# Patient Record
Sex: MALE | Race: BLACK OR AFRICAN AMERICAN | NOT HISPANIC OR LATINO | Employment: FULL TIME | URBAN - METROPOLITAN AREA
[De-identification: names, ages, dates, MRNs, and addresses within clinical notes are randomized per-mention and may not be internally consistent; named-entity substitution may affect disease eponyms.]

---

## 2024-01-09 ENCOUNTER — HOSPITAL ENCOUNTER (EMERGENCY)
Facility: OTHER | Age: 34
Discharge: HOME | End: 2024-01-09
Attending: EMERGENCY MEDICINE
Payer: MEDICAID

## 2024-01-09 VITALS
BODY MASS INDEX: 28 KG/M2 | HEIGHT: 71 IN | RESPIRATION RATE: 19 BRPM | HEART RATE: 109 BPM | TEMPERATURE: 98 F | SYSTOLIC BLOOD PRESSURE: 157 MMHG | DIASTOLIC BLOOD PRESSURE: 92 MMHG | WEIGHT: 200 LBS | OXYGEN SATURATION: 97 %

## 2024-01-09 DIAGNOSIS — J06.9 VIRAL URI WITH COUGH: Primary | ICD-10-CM

## 2024-01-09 LAB
CTP QC/QA: YES
CTP QC/QA: YES
POC MOLECULAR INFLUENZA A AGN: NEGATIVE
POC MOLECULAR INFLUENZA B AGN: NEGATIVE
SARS-COV-2 RDRP RESP QL NAA+PROBE: NEGATIVE

## 2024-01-09 PROCEDURE — 99282 EMERGENCY DEPT VISIT SF MDM: CPT

## 2024-01-09 PROCEDURE — 87635 SARS-COV-2 COVID-19 AMP PRB: CPT | Performed by: PHYSICIAN ASSISTANT

## 2024-01-09 NOTE — FIRST PROVIDER EVALUATION
" Emergency Department TeleTriage Encounter Note      CHIEF COMPLAINT    Chief Complaint   Patient presents with    General Illness     Pt in ED 3 days ago but left before getting to the back. Pt back today to get evaluated and provided with work note. Pt only reporting subjective fever at this time and stating "I feel better besides that."       VITAL SIGNS   Initial Vitals   BP Pulse Resp Temp SpO2   01/09/24 0958 01/09/24 0958 01/09/24 0958 01/09/24 0958 01/09/24 1000   (!) 157/92 109 19 97.9 °F (36.6 °C) 97 %      MAP       --                   ALLERGIES    Review of patient's allergies indicates:  No Known Allergies    PROVIDER TRIAGE NOTE  Patient presents with complaint of needing a work note.  Came to the ER on the 6 but left prior to being evaluated.  Reports subjective fever like symptoms at home which have improved.      Phy:   Constitutional: well nourished, well developed, appearing stated age, NAD        Initial orders will be placed and care will be transferred to an alternate provider when patient is roomed for a full evaluation. Any additional orders and the final disposition will be determined by that provider.        ORDERS  Labs Reviewed - No data to display    ED Orders (720h ago, onward)      None              Virtual Visit Note: The provider triage portion of this emergency department evaluation and documentation was performed via [employer], a HIPAA-compliant telemedicine application, in concert with a tele-presenter in the room. A face to face patient evaluation with one of my colleagues will occur once the patient is placed in an emergency department room.      DISCLAIMER: This note was prepared with [employer] voice recognition transcription software. Garbled syntax, mangled pronouns, and other bizarre constructions may be attributed to that software system.    "

## 2024-01-09 NOTE — Clinical Note
"Honorio"Carmencita Briceño was seen and treated in our emergency department on 1/9/2024.  He may return to work on 01/10/2024.  COVID and flu testing negative. Please excuse him due to recent illness, now improved     If you have any questions or concerns, please don't hesitate to call.      Evelio Esparza MD"

## 2024-01-09 NOTE — ED PROVIDER NOTES
"Encounter Date: 1/9/2024    SCRIBE #1 NOTE: I, Merissa Chen, am scribing for, and in the presence of,  Evelio Esparza MD. I have scribed the following portions of the note - Other sections scribed: HPI, ROS, PE.       History     Chief Complaint   Patient presents with    General Illness     Pt in ED 3 days ago but left before getting to the back. Pt back today to get evaluated and provided with work note. Pt only reporting subjective fever at this time and stating "I feel better besides that."     Patient is a 33 y.o. male presenting to the ED requesting a work note for the last few days, after being sick. Pt reports feeling ill for the past week, endorsing body aches, chills, fever, coughing, rhinorrhea, diarrhea, and weakness. He denies vomiting. He took Nyquil and Theraflu to treat, then came to the ED 3 days ago, but left before being seen due to the wait. Pt states that he is feeling better and believes he is ready to go back to work but needs clearance to do so. He states that his appetite has been increasing since yesterday and his energy level is better, though still not 100%. He states that his fever has resolved, but he still gets overheated easily with exertion. Pt has been drinking Gatorade, but reports that his urine is darker than usual. He endorses only a mild, occasional cough but no SOB. This is the extent of the patient's complaints at this time.    The history is provided by the patient and medical records. No  was used.     Review of patient's allergies indicates:  No Known Allergies  No past medical history on file.  No past surgical history on file.  No family history on file.     Review of Systems   Constitutional:  Positive for appetite change (resolved), chills (resolved) and fever (resolved).   HENT:  Positive for rhinorrhea (resolved). Negative for congestion.    Eyes:  Negative for redness.   Respiratory:  Positive for cough. Negative for shortness of breath.  "   Cardiovascular:  Negative for chest pain.   Gastrointestinal:  Positive for diarrhea. Negative for abdominal pain and vomiting.   Genitourinary:  Negative for dysuria.   Musculoskeletal:  Positive for myalgias (resolved).   Skin:  Negative for rash.   Neurological:  Positive for weakness (resolved). Negative for headaches.   Psychiatric/Behavioral:  Negative for confusion.        Physical Exam     Initial Vitals   BP Pulse Resp Temp SpO2   01/09/24 0958 01/09/24 0958 01/09/24 0958 01/09/24 0958 01/09/24 1000   (!) 157/92 109 19 97.9 °F (36.6 °C) 97 %      MAP       --                Physical Exam    Nursing note and vitals reviewed.  Constitutional: He appears well-developed and well-nourished. He is not diaphoretic. No distress.   HENT:   Head: Normocephalic and atraumatic.   Eyes: Conjunctivae are normal. No scleral icterus.   Neck: Neck supple.   Cardiovascular:  Normal rate, regular rhythm, normal heart sounds and intact distal pulses.           No murmur heard.  Pulmonary/Chest: Breath sounds normal. No respiratory distress. He has no wheezes. He has no rhonchi. He has no rales.   Abdominal: Abdomen is soft. There is no abdominal tenderness. There is no rebound and no guarding.   Musculoskeletal:         General: No edema.      Cervical back: Neck supple.     Neurological: He is alert and oriented to person, place, and time.   Skin: Skin is warm and dry.   Psychiatric: He has a normal mood and affect.         ED Course   Procedures  Labs Reviewed   POCT INFLUENZA A/B MOLECULAR   SARS-COV-2 RDRP GENE          Imaging Results    None          Medications - No data to display  Medical Decision Making      Healthy 33-year-old male presents for evaluation of URI symptoms and for work clearance.  He started having cough and subjective fevers/chills, malaise, fatigue, myalgias and nasal congestion about a week ago.  He had a few episodes of vomiting that quickly resolved, has had decreased appetite and loose stools  since then that somewhat improved over the past few days.  He tried going back to work 3 days ago but felt too fatigued, came to be seen in the ED but was not seen in left due to long wait times.  He started to feel better yesterday and is requesting work note so he can return tomorrow.  He has minimal cough now, denies any difficulty breathing, fevers, or other new symptoms.  On arrival patient well-appearing in no distress, with normal vitals and normal lung exam, no signs of severe dehydration or pneumonia.  His rapid flu and COVID swabs were negative.  Patient likely had viral URI that is resolving, his persistent mild cough is expected and mild residual fatigue likely related to persistent dehydration and viral illness.  He is advised on further supportive care and to continue aggressive hydration, OTC medications as needed.  No indication for antibiotics or emergent imaging or workup, patient cleared to return to work tomorrow since his symptoms are almost resolved and he has not had fever for a few days, with return precautions for any worsening or recurrent symptoms or other concerns.                Scribe Attestation:   Scribe #1: I performed the above scribed service and the documentation accurately describes the services I performed. I attest to the accuracy of the note.         I, Dr. Evelio Esparza, personally performed the services described in this documentation. All medical record entries made by the scribe were at my direction and in my presence.  I have reviewed the chart and agree that the record reflects my personal performance and is accurate and complete. Evelio Esparza MD.                           Clinical Impression:  Final diagnoses:  [J06.9] Viral URI with cough (Primary)          ED Disposition Condition    Discharge Stable          ED Prescriptions    None       Follow-up Information       Follow up With Specialties Details Why Contact Info    [facility] - Emergency Dept Emergency  Medicine Go to  If symptoms worsen [address]  [facility] [address]  [phone]             Evelio Esparza MD  01/09/24 5827

## 2024-01-09 NOTE — ED TRIAGE NOTES
Reports fever, body aches, cough and congestion over past few days. Taking OTC meds for symptoms. States symptoms have resolved now but needs clearance to return to work. Presents in no distress.

## 2025-02-01 ENCOUNTER — HOSPITAL ENCOUNTER (EMERGENCY)
Facility: OTHER | Age: 35
Discharge: HOME | End: 2025-02-01
Attending: EMERGENCY MEDICINE
Payer: MEDICAID

## 2025-02-01 VITALS
RESPIRATION RATE: 18 BRPM | BODY MASS INDEX: 30.06 KG/M2 | OXYGEN SATURATION: 99 % | SYSTOLIC BLOOD PRESSURE: 131 MMHG | DIASTOLIC BLOOD PRESSURE: 87 MMHG | HEIGHT: 70 IN | TEMPERATURE: 98 F | WEIGHT: 210 LBS | HEART RATE: 90 BPM

## 2025-02-01 DIAGNOSIS — K04.7 DENTAL INFECTION: Primary | ICD-10-CM

## 2025-02-01 PROCEDURE — 99284 EMERGENCY DEPT VISIT MOD MDM: CPT

## 2025-02-01 PROCEDURE — 25000003 PHARM REV CODE 250: Performed by: EMERGENCY MEDICINE

## 2025-02-01 RX ORDER — AMOXICILLIN AND CLAVULANATE POTASSIUM 875; 125 MG/1; MG/1
1 TABLET, FILM COATED ORAL
Status: COMPLETED | OUTPATIENT
Start: 2025-02-01 | End: 2025-02-01

## 2025-02-01 RX ORDER — HYDROCODONE BITARTRATE AND ACETAMINOPHEN 5; 325 MG/1; MG/1
1 TABLET ORAL EVERY 4 HOURS PRN
Qty: 12 TABLET | Refills: 0 | Status: SHIPPED | OUTPATIENT
Start: 2025-02-01 | End: 2025-02-11

## 2025-02-01 RX ORDER — IBUPROFEN 600 MG/1
600 TABLET ORAL EVERY 6 HOURS PRN
Qty: 20 TABLET | Refills: 0 | Status: SHIPPED | OUTPATIENT
Start: 2025-02-01

## 2025-02-01 RX ORDER — AMOXICILLIN AND CLAVULANATE POTASSIUM 875; 125 MG/1; MG/1
1 TABLET, FILM COATED ORAL 2 TIMES DAILY
Qty: 14 TABLET | Refills: 0 | Status: SHIPPED | OUTPATIENT
Start: 2025-02-01

## 2025-02-01 RX ORDER — IBUPROFEN 600 MG/1
600 TABLET ORAL
Status: COMPLETED | OUTPATIENT
Start: 2025-02-01 | End: 2025-02-01

## 2025-02-01 RX ADMIN — AMOXICILLIN AND CLAVULANATE POTASSIUM 1 TABLET: 875; 125 TABLET, FILM COATED ORAL at 03:02

## 2025-02-01 RX ADMIN — IBUPROFEN 600 MG: 600 TABLET, FILM COATED ORAL at 03:02

## 2025-02-01 NOTE — Clinical Note
"Honorio"Carmencita Briceño was seen and treated in our emergency department on 2/1/2025.  He may return to work on 02/02/2025.  Patient was seen in the emergency room today.  Please excuse his absence.  He may return sooner if feeling improved.     If you have any questions or concerns, please don't hesitate to call.      Bety Wheat MD"

## 2025-02-01 NOTE — ED PROVIDER NOTES
"Encounter Date: 2/1/2025       History     Chief Complaint   Patient presents with    Dental Pain     Pt states that he started to have dental pain x2 weeks when the weather changes, pain got worse today. No relief with orajel or ibuprofen     34-year-old male presents with complaint of dental pain.  He reports chronic right-sided dental pain which became worse yesterday.  He states pain is exacerbated by the cold weather.  Pain is located on the right upper back molar.  There is no associated fever facial swelling.  He has not seen the dentist "in a minute."    The history is provided by the patient.     Review of patient's allergies indicates:  No Known Allergies  History reviewed. No pertinent past medical history.  History reviewed. No pertinent surgical history.  No family history on file.  Social History     Tobacco Use    Smoking status: Every Day     Types: Cigarettes    Smokeless tobacco: Never   Substance Use Topics    Drug use: Not Currently     Review of Systems   Constitutional:  Negative for chills and fever.   HENT:  Negative for facial swelling and sore throat.    Gastrointestinal:  Negative for vomiting.   Musculoskeletal:  Positive for neck pain.   Skin:  Negative for color change, rash and wound.   Psychiatric/Behavioral:  Negative for behavioral problems and confusion.        Physical Exam     Initial Vitals [02/01/25 1437]   BP Pulse Resp Temp SpO2   131/87 90 18 98.2 °F (36.8 °C) 99 %      MAP       --         Physical Exam    Constitutional: He appears well-developed and well-nourished.   HENT:   Head: Normocephalic and atraumatic.   Nose: Nose normal. Mouth/Throat: Oropharynx is clear and moist.   Poor dentition throughout.  There is no trismus, no lingual elevation, no palpable abscess.  No facial swelling.  Right upper posterior molar has large erosion with surrounding gingival inflammation and positive tooth tap.   Eyes: Conjunctivae and EOM are normal. Pupils are equal, round, and " reactive to light.   Neck: Neck supple.   Normal range of motion.  Cardiovascular:  Normal rate and regular rhythm.     Exam reveals no gallop and no friction rub.       No murmur heard.  Pulmonary/Chest: Breath sounds normal. No respiratory distress. He has no wheezes. He has no rales.   Abdominal: Abdomen is soft. Bowel sounds are normal. There is no abdominal tenderness. There is no rebound and no guarding.   Musculoskeletal:         General: No tenderness or edema.      Cervical back: Normal range of motion and neck supple.     Neurological: He is alert and oriented to person, place, and time. He has normal strength. No cranial nerve deficit or sensory deficit. Gait normal. GCS score is 15. GCS eye subscore is 4. GCS verbal subscore is 5. GCS motor subscore is 6.   Skin: Skin is warm and dry. No rash noted.   Psychiatric: He has a normal mood and affect. His speech is normal and behavior is normal.       ED Course   Procedures  Labs Reviewed - No data to display       Imaging Results    None          Medications   ibuprofen tablet 600 mg (600 mg Oral Given 2/1/25 1514)   amoxicillin-clavulanate 875-125mg per tablet 1 tablet (1 tablet Oral Given 2/1/25 1514)     Medical Decision Making  Urgent evaluation of 34-year-old male who presents with complaint of dental pain.  Vital signs are benign, afebrile.  On exam he has multiple dental caries, the source of his pain is a large eroded tooth with gingival inflammation.  Will treat with Augmentin and analgesics.  Dental follow-up is encouraged.  There is no evidence of a deep space abscess at this time such as Ari's angina, no evidence of an abscess that would be drainable by me.  Patient is strongly encouraged to follow-up with a dentist as medications prescribed today will not be curative.  He is encouraged to return as needed if he develops fever, facial swelling, other concerns.    Risk  Prescription drug management.                              Clinical  Impression:  Final diagnoses:  [K04.7] Dental infection (Primary)          ED Disposition Condition    Discharge Stable          ED Prescriptions       Medication Sig Dispense Start Date End Date Auth. Provider    ibuprofen (ADVIL,MOTRIN) 600 MG tablet Take 1 tablet (600 mg total) by mouth every 6 (six) hours as needed for Pain. 20 tablet 2/1/2025 -- Bety Wehat MD    amoxicillin-clavulanate 875-125mg (AUGMENTIN) 875-125 mg per tablet Take 1 tablet by mouth 2 (two) times daily. 14 tablet 2/1/2025 -- Bety Wheat MD    HYDROcodone-acetaminophen (NORCO) 5-325 mg per tablet Take 1 tablet by mouth every 4 (four) hours as needed for Pain. 12 tablet 2/1/2025 2/11/2025 Bety Wheat MD          Follow-up Information       Follow up With Specialties Details Why Contact Info    Your dentist  Schedule an appointment as soon as possible for a visit                Bety Wheat MD  02/01/25 6679

## 2025-02-01 NOTE — ED TRIAGE NOTES
Pt arrived to ED complaining of dental pain that started two weeks ago that got worse last night. Pt has used three over the counter meds with no relief.